# Patient Record
(demographics unavailable — no encounter records)

---

## 2024-10-23 NOTE — HISTORY OF PRESENT ILLNESS
[FreeTextEntry1] : EGD  EGD Procedure:  Patient was placed in left lateral decubitus position. The endoscope was introduced through mouth and advanced under direct visualization until second part of the duodenum reached. Patient tolerance to the procedure was good. The procedure was not difficult.  EGD Findings:  Stomach Excavated lesions A single ulcer was found in the antrum and clean based.  Duodenum Additional duodenum findings cecum.  EGD Impressions:  Ulcer in the antrum and clean based.  Cecum.  EGD Other Interventions:  Multiple cold forceps biopsies were performed for histology in the second part of the duodenum, stomach antrum and stomach body.  EGD Limitations/Complications:  There were no apparent limitations or complications  Colonoscopy  Colonoscopy Procedure:  The quality of preparation was Good. Patient was placed in left lateral decubitus position. The colonoscope was introduced through rectum and advanced under direct visualization until cecum reached. The appendiceal orifice and the ileocecal valve were identified. The terminal ileum was identified. The colonoscope was retroflexed within the rectum. Careful visualization was performed as the instrument was withdrawn. Patient tolerance to the procedure was excellent. The procedure was not difficult. Digital exam was normal.  [place] Bowel Preparation Score:  Using the [place] Bowel Preparation Score, each segment of the colon was graded as follows:  Left Colon: Good, 2 | Transverse Colon: Good, 2  | Right Colon: Good, 2  Colonoscopy Findings:  Excavated lesions Multiple diverticula were seen in the whole colon; pancolonic diverticulosis, severe, with blood throughout the colon and unable to identify the actually site of the bleeding.  Colonoscopy Impressions:  Diverticulosis of the whole colon; pancolonic diverticulosis, severe, with blood throughout the colon and unable to identify the actually site of the bleeding.    Colonoscopy Limitations/Complications:  There were no apparent limitations or complications  Plan:  patient needs CT with bleeding protocol to see if wee can identify site of bleeding, and perhaps consider embolization   FINAL DIAGNOSIS    A.  DUODENUM, DESCENDING, BIOPSY: 	- Unremarkable duodenal mucosa  B.  STOMACH, RANDOM BIOPSY: - Gastric mucosa with focal intestinal metaplasia associated with mild chronic and minimal acute inflammation 	- Negative for dysplasia 	- Negative for H. pylori on Diff-Quik stain  C.  GASTRIC ULCER, BIOPSY: 	- Fragment of benign gastric ulcer 	- Separate fragments of gastric mucosa showing reactive gastropathy 	- Negative for H. pylori on Diff-Quik stain 	- Deeper levels were evaluated   DIAGNOSIS COMMENT     Part C was reviewed intradepartmentally with consensus   MICROSCOPIC DESCRIPTION     Microscopic performed   [de-identified] : Exam Date: 	  10/14/24					 Exam: 	CT ABD/PEL WAW IC					 Order#:	[id number]					                CLINICAL INFORMATION: Rectal bleeding.   ADDITIONAL CLINICAL INFORMATION: Other, Non-specified  COMPARISON: CT abdomen pelvis dated 10/13/2024.  CONTRAST/COMPLICATIONS: IV Contrast: Omnipaque 350  90 cc administered   10 cc discarded Oral Contrast: NONE Complications: None reported at time of study completion  PROCEDURE:  CT of the Abdomen and Pelvis was performed. Precontrast, Arterial and Delayed phases were performed. Sagittal and coronal reformats were performed.  FINDINGS: LOWER CHEST: Redemonstration of spiculated opacity left upper lobe, partially visualized.  LIVER: Within normal limits. BILE DUCTS: Normal caliber. GALLBLADDER: Within normal limits. SPLEEN: Within normal limits. PANCREAS: Within normal limits. ADRENALS: Within normal limits. KIDNEYS/URETERS: Within normal limits.  BLADDER: Within normal limits. REPRODUCTIVE ORGANS: Prostate is enlarged.  BOWEL: No bowel obstruction. Colonic diverticulosis. No intraluminal contrast extravasation to suggest the presence of active gastrointestinal hemorrhage. Appendix is normal. PERITONEUM/RETROPERITONEUM: No ascites. 2 cm soft tissue nodule with central calcification, seen on prior CT near the internal inguinal ring, now lies in the left hemipelvis adjacent to the rectosigmoid colon. This likely represents a sloughed infarcted epiploic appendage. VESSELS: Within normal limits. LYMPH NODES: No lymphadenopathy.   ABDOMINAL WALL: Within normal limits. BONES: Within normal limits.   IMPRESSION:  No CT evidence of active gastrointestinal hemorrhage.

## 2024-10-23 NOTE — PHYSICAL EXAM
[Normal Sphincter Tone] : normal sphincter tone [No External Hemorrhoid] : no external hemorrhoids [Chaperone Present: ____] : chaperone present: [unfilled] [Normal rectal exam] : was normal [Occult Blood] : negative occult blood [External Hemorrhoid] : no external hemorrhoids were present [Tender, Swollen] : that was nontender and non-swollen [Skin Tags] : there were no residual hemorrhoidal skin tags seen

## 2024-10-23 NOTE — ASSESSMENT
[FreeTextEntry1] : GI bleeding - Negative guaiac on KANDICE, stool was soft brown appearing.  - Repeat CBC with iron studies today. - Continue pantoprazole daily. - Discussed importance of ETOH abstinence, provided him information regarding inpatient and outpatient substance abuse programs through [facility].  - Advised follow-up with PCP regarding sleeping difficulties and for hospital follow-up. Verbalized understanding.   L shoulder pain - Recommend sling for support and apply ice alternating with heating pads.  - Referral to Dr. Hernandez.

## 2024-10-23 NOTE — ASSESSMENT
[FreeTextEntry1] : GI bleeding - Negative guaiac on KANDICE, stool was soft brown appearing.  - Repeat CBC with iron studies today. - Continue pantoprazole daily. - Discussed importance of ETOH abstinence, provided him information regarding inpatient and outpatient substance abuse programs through [facility].  - Advised follow-up with PCP regarding sleeping difficulties and for hospital follow-up. Verbalized understanding.   L shoulder pain - Recommend sling for support and apply ice alternating with heating pads.  - Referral to Dr. Hernandez.

## 2024-10-23 NOTE — HISTORY OF PRESENT ILLNESS
[FreeTextEntry1] : EGD  EGD Procedure:  Patient was placed in left lateral decubitus position. The endoscope was introduced through mouth and advanced under direct visualization until second part of the duodenum reached. Patient tolerance to the procedure was good. The procedure was not difficult.  EGD Findings:  Stomach Excavated lesions A single ulcer was found in the antrum and clean based.  Duodenum Additional duodenum findings cecum.  EGD Impressions:  Ulcer in the antrum and clean based.  Cecum.  EGD Other Interventions:  Multiple cold forceps biopsies were performed for histology in the second part of the duodenum, stomach antrum and stomach body.  EGD Limitations/Complications:  There were no apparent limitations or complications  Colonoscopy  Colonoscopy Procedure:  The quality of preparation was Good. Patient was placed in left lateral decubitus position. The colonoscope was introduced through rectum and advanced under direct visualization until cecum reached. The appendiceal orifice and the ileocecal valve were identified. The terminal ileum was identified. The colonoscope was retroflexed within the rectum. Careful visualization was performed as the instrument was withdrawn. Patient tolerance to the procedure was excellent. The procedure was not difficult. Digital exam was normal.  [place] Bowel Preparation Score:  Using the [place] Bowel Preparation Score, each segment of the colon was graded as follows:  Left Colon: Good, 2 | Transverse Colon: Good, 2  | Right Colon: Good, 2  Colonoscopy Findings:  Excavated lesions Multiple diverticula were seen in the whole colon; pancolonic diverticulosis, severe, with blood throughout the colon and unable to identify the actually site of the bleeding.  Colonoscopy Impressions:  Diverticulosis of the whole colon; pancolonic diverticulosis, severe, with blood throughout the colon and unable to identify the actually site of the bleeding.    Colonoscopy Limitations/Complications:  There were no apparent limitations or complications  Plan:  patient needs CT with bleeding protocol to see if wee can identify site of bleeding, and perhaps consider embolization   FINAL DIAGNOSIS    A.  DUODENUM, DESCENDING, BIOPSY: 	- Unremarkable duodenal mucosa  B.  STOMACH, RANDOM BIOPSY: - Gastric mucosa with focal intestinal metaplasia associated with mild chronic and minimal acute inflammation 	- Negative for dysplasia 	- Negative for H. pylori on Diff-Quik stain  C.  GASTRIC ULCER, BIOPSY: 	- Fragment of benign gastric ulcer 	- Separate fragments of gastric mucosa showing reactive gastropathy 	- Negative for H. pylori on Diff-Quik stain 	- Deeper levels were evaluated   DIAGNOSIS COMMENT     Part C was reviewed intradepartmentally with consensus   MICROSCOPIC DESCRIPTION     Microscopic performed   [de-identified] : Exam Date: 	  10/14/24					 Exam: 	CT ABD/PEL WAW IC					 Order#:	CT [id number]					                CLINICAL INFORMATION: Rectal bleeding.   ADDITIONAL CLINICAL INFORMATION: Other, Non-specified  COMPARISON: CT abdomen pelvis dated 10/13/2024.  CONTRAST/COMPLICATIONS: IV Contrast: Omnipaque 350  90 cc administered   10 cc discarded Oral Contrast: NONE Complications: None reported at time of study completion  PROCEDURE:  CT of the Abdomen and Pelvis was performed. Precontrast, Arterial and Delayed phases were performed. Sagittal and coronal reformats were performed.  FINDINGS: LOWER CHEST: Redemonstration of spiculated opacity left upper lobe, partially visualized.  LIVER: Within normal limits. BILE DUCTS: Normal caliber. GALLBLADDER: Within normal limits. SPLEEN: Within normal limits. PANCREAS: Within normal limits. ADRENALS: Within normal limits. KIDNEYS/URETERS: Within normal limits.  BLADDER: Within normal limits. REPRODUCTIVE ORGANS: Prostate is enlarged.  BOWEL: No bowel obstruction. Colonic diverticulosis. No intraluminal contrast extravasation to suggest the presence of active gastrointestinal hemorrhage. Appendix is normal. PERITONEUM/RETROPERITONEUM: No ascites. 2 cm soft tissue nodule with central calcification, seen on prior CT near the internal inguinal ring, now lies in the left hemipelvis adjacent to the rectosigmoid colon. This likely represents a sloughed infarcted epiploic appendage. VESSELS: Within normal limits. LYMPH NODES: No lymphadenopathy.   ABDOMINAL WALL: Within normal limits. BONES: Within normal limits.   IMPRESSION:  No CT evidence of active gastrointestinal hemorrhage.

## 2024-10-31 NOTE — ASSESSMENT
[FreeTextEntry1] : 75-year-old male with left rotator cuff tendinitis, likely rotator cuff tears    -Discussed diagnosis, natural history of condition, and treatment options -Recommend PT to work on rotator cuff strengthening and range of motion -Steroid injection provided today -Discussed the risk benefits and alternatives of surgical intervention.  Explained given his longstanding shoulder pain and his age he likely has chronic tears in his rotator cuff.  For this reason surgical repair of the rotator cuff is unlikely to be successful given poor tissue quality and chronic retraction.  Explained therapy and steroid injections should be able to control her symptoms and patient agrees -Can return in 6 to 8 weeks if no improvement -All questions answered, patient in agreement

## 2024-10-31 NOTE — HISTORY OF PRESENT ILLNESS
[Right] : right hand dominant [FreeTextEntry1] : Fell 3 weeks ago onto left shoulder after trip and fall.  He was evaluated in the emergency department and x-rays were read as no fracture.  Continues to have left-sided shoulder pain that is worse with overhead activities and at night when sleeping on the left shoulder.  Presents today for evaluation. Has 20 years of bilateral shoulder pain.  Reports that he had steroid injections into the left shoulder 15 years ago which somewhat helped.  Prior to this injury he had off-and-on shoulder pain and cannot raise his arms above his head easily.

## 2024-10-31 NOTE — PROCEDURE
[FreeTextEntry1] :  Procedure: Subacromial Injection: After proceeding with informed consent and discussing the risks, benefits, and alternatives to a steroid injection, 6mg of celestone and 4cc of 1% lidocaine was injected into the left subacromial space under sterile conditions. The patient tolerated the procedure well and there were no complications. Post-injection instructions were discussed with the patient.

## 2024-10-31 NOTE — PHYSICAL EXAM
[de-identified] : Physical Exam   Gen: NAD Resp: Nonlabored Cards: WWP   Left shoulder   Skin intact, no lesions Normal deltoid contour   TTP around GT/rotator cuff insertion, posteriorly, AC joint, biceps tendon  No pain with neck ROM (full ROM)  FF 85 ER arm at side 45 IR to pocket No limitations in passive ROM Positive drop arm test Positive speeds Supraspinatus impingement signs positive Neers positive Antunez positive WWP No numbness [de-identified] :  3 views of the left shoulder were obtained and reviewed in clinic showing no fractures or dislocations, significant AC joint arthritis, slight superior displacement of the humeral head indicating likely chronic rotator cuff tear

## 2024-11-19 NOTE — HISTORY OF PRESENT ILLNESS
[Home] : at home, [unfilled] , at the time of the visit. [Medical Office: ([facility])___] : at the medical office located in  [Verbal consent obtained from patient] : the patient, [unfilled] [FreeTextEntry1] : EGD  EGD Procedure:  Patient was placed in left lateral decubitus position. The endoscope was introduced through mouth and advanced under direct visualization until second part of the duodenum reached. Patient tolerance to the procedure was good. The procedure was not difficult.  EGD Findings:  Stomach Excavated lesions A single ulcer was found in the antrum and clean based.  Duodenum Additional duodenum findings cecum.  EGD Impressions:  Ulcer in the antrum and clean based.  Cecum.  EGD Other Interventions:  Multiple cold forceps biopsies were performed for histology in the second part of the duodenum, stomach antrum and stomach body.  EGD Limitations/Complications:  There were no apparent limitations or complications  Colonoscopy  Colonoscopy Procedure:  The quality of preparation was Good. Patient was placed in left lateral decubitus position. The colonoscope was introduced through rectum and advanced under direct visualization until cecum reached. The appendiceal orifice and the ileocecal valve were identified. The terminal ileum was identified. The colonoscope was retroflexed within the rectum. Careful visualization was performed as the instrument was withdrawn. Patient tolerance to the procedure was excellent. The procedure was not difficult. Digital exam was normal.  [place] Bowel Preparation Score:  Using the [place] Bowel Preparation Score, each segment of the colon was graded as follows:  Left Colon: Good, 2 | Transverse Colon: Good, 2  | Right Colon: Good, 2  Colonoscopy Findings:  Excavated lesions Multiple diverticula were seen in the whole colon; pancolonic diverticulosis, severe, with blood throughout the colon and unable to identify the actually site of the bleeding.  Colonoscopy Impressions:  Diverticulosis of the whole colon; pancolonic diverticulosis, severe, with blood throughout the colon and unable to identify the actually site of the bleeding.    Colonoscopy Limitations/Complications:  There were no apparent limitations or complications  Plan:  patient needs CT with bleeding protocol to see if wee can identify site of bleeding, and perhaps consider embolization   FINAL DIAGNOSIS    A.  DUODENUM, DESCENDING, BIOPSY: 	- Unremarkable duodenal mucosa  B.  STOMACH, RANDOM BIOPSY: - Gastric mucosa with focal intestinal metaplasia associated with mild chronic and minimal acute inflammation 	- Negative for dysplasia 	- Negative for H. pylori on Diff-Quik stain  C.  GASTRIC ULCER, BIOPSY: 	- Fragment of benign gastric ulcer 	- Separate fragments of gastric mucosa showing reactive gastropathy 	- Negative for H. pylori on Diff-Quik stain 	- Deeper levels were evaluated   DIAGNOSIS COMMENT     Part C was reviewed intradepartmentally with consensus   MICROSCOPIC DESCRIPTION     Microscopic performed   [de-identified] : Exam Date: 	  11/15/24					 Exam: 	CT CHEST					 Order#:	[id number]					                EXAMINATION: CT CHEST  CLINICAL INDICATION: Dyspnea.  TECHNIQUE: Noncontrast CT of the chest was obtained.    COMPARISON: Multiple CT, most recent 12/16/2023.    FINDINGS:   AIRWAYS AND LUNGS: The central tracheobronchial tree is patent.  Left-sided pleural thickening. Redemonstrated thick linear opacity left upper lobe  MEDIASTINUM AND PLEURA: There are no enlarged mediastinal, hilar or axillary lymph nodes. The visualized portion of the thyroid gland is unremarkable. There is no pleural effusion. There is no pneumothorax.    HEART AND VESSELS: There is cardiomegaly.  There are atherosclerotic calcifications of the aorta and coronary arteries.  There is no pericardial effusion.  UPPER ABDOMEN: Images of the upper abdomen demonstrate no abnormality.   BONES AND SOFT TISSUES: The bones are unremarkable.  The soft tissues are unremarkable.   IMPRESSION:  Left-sided pleural thickening. Redemonstrated thick linear opacity left upper lobe

## 2024-11-19 NOTE — ASSESSMENT
[FreeTextEntry1] : GIB, post ED visit - CBC improved since last visit. - Denies melena or hematochezia.  - Dizziness now improved but having trouble sleeping, working with PCP. - States he has not contacted substance use counseling center at [place].

## 2024-12-30 NOTE — HISTORY OF PRESENT ILLNESS
[FreeTextEntry1] : Exam patient is here for a follow-up, first visit for the month  Patient has been doing well since his discharge from [place]. He had a severe hemorrhage from the GI tract which most probably was a diverticular hemorrhage, I did a colonoscopy which revealed of profuse amount of blood, and he had burgundy stool, and in fact although I got to the cecum the visualization was quite limited because of the extensive amount of blood. We made the decision at that time that we would have to do a repeat colonoscopy as an outpatient with a more optimal prep I did an EGD at the same time to look for varices, which she did not have, but he did have a 1 cm clean-based ulcer that I did not feel was bleeding He went home on pantoprazole but ran out in October and has not been on pantoprazole since that time.  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  Patient is diabetic, has type 2 diabetes, and he is on glipizide extended release which she takes in the night in the evening or at nighttime.  He also takes gabapentin for pain relief 100 mg at bedtime He also takes lisinopril for hypertension He is not on any anticoagulants, he has not been using aspirin He does not have any coronary disease, no symptoms of chest pain or angina pectoris.  From that point of view he has been good He continues to be nicotine dependent and he smokes regularly in an amount of three cigs per day  he fu with dr Hoff of the pulmonary service  for chest or pulmonary nodule

## 2024-12-30 NOTE — ASSESSMENT
[FreeTextEntry1] : 1.  patient needs an egd to assess ulcer healing and colonoscopy with a good bowel prep, and we will use clenpiq 2.  i would like him to be on his pantoprazole for one month prior to the procedure, which we can do in about mid feb  i am ordering the pantoprazole

## 2025-05-02 NOTE — HISTORY OF PRESENT ILLNESS
[FreeTextEntry1] : Patient has no history of Mi, CHF or CP syndrome  She has a hx of HTN, HLD  He denies CP, SOB, PND or orthopnea Occasional palpitations, but no dizziness

## 2025-05-02 NOTE — REASON FOR VISIT
[Hyperlipidemia] : hyperlipidemia [Hypertension] : hypertension [FreeTextEntry3] : Parker Concepcion, NP

## 2025-07-23 NOTE — PHYSICAL EXAM

## 2025-07-23 NOTE — PHYSICAL EXAM

## 2025-07-23 NOTE — PHYSICAL EXAM

## 2025-07-23 NOTE — HISTORY OF PRESENT ILLNESS
[FreeTextEntry1] : Hasn't had the monitor  Used to drink -. not anymore Rides an electric bike -. fell recently -. no dizziness or LOC  Congested chest -. light colored phlegm No CP, SOB, palpitations

## 2025-07-23 NOTE — PHYSICAL EXAM
